# Patient Record
Sex: MALE | URBAN - METROPOLITAN AREA
[De-identification: names, ages, dates, MRNs, and addresses within clinical notes are randomized per-mention and may not be internally consistent; named-entity substitution may affect disease eponyms.]

---

## 2023-09-05 LAB
CHOLESTEROL (MG/DL) IN SER/PLAS: 104 MG/DL (ref 0–199)
CHOLESTEROL IN HDL (MG/DL) IN SER/PLAS: 39.4 MG/DL
CHOLESTEROL/HDL RATIO: 2.6
LDL: 49 MG/DL (ref 0–99)
TRIGLYCERIDE (MG/DL) IN SER/PLAS: 77 MG/DL (ref 0–149)
VLDL: 15 MG/DL (ref 0–40)

## 2023-12-11 DIAGNOSIS — I10 ESSENTIAL HYPERTENSION, BENIGN: Primary | ICD-10-CM

## 2023-12-11 RX ORDER — AMLODIPINE BESYLATE 10 MG/1
10 TABLET ORAL DAILY
COMMUNITY
Start: 2023-09-15 | End: 2023-12-11 | Stop reason: SDUPTHER

## 2023-12-11 RX ORDER — AMLODIPINE BESYLATE 10 MG/1
10 TABLET ORAL DAILY
Qty: 90 TABLET | Refills: 2 | Status: SHIPPED | OUTPATIENT
Start: 2023-12-11 | End: 2024-09-06

## 2023-12-11 NOTE — TELEPHONE ENCOUNTER
Last appointment: 8/30/23 with Dr. Macias  Next appointment: 8/8/24 with Dr. Macias  6/1/23 saw Manasa, but Manasa is out of the office today.

## 2023-12-11 NOTE — TELEPHONE ENCOUNTER
----- Message from Geovany Chirinos sent at 12/10/2023  4:00 PM EST -----  Regarding: Need refill   Contact: [phone]  I need a refill:  Amlodipine 10mg.  [facility].      Thank you.

## 2024-07-26 PROBLEM — E11.9 TYPE 2 DIABETES MELLITUS (MULTI): Status: ACTIVE | Noted: 2024-07-26

## 2024-07-26 PROBLEM — I49.3 PVC (PREMATURE VENTRICULAR CONTRACTION): Status: ACTIVE | Noted: 2024-07-26

## 2024-07-26 PROBLEM — E78.5 HYPERLIPIDEMIA: Status: ACTIVE | Noted: 2024-07-26

## 2024-07-26 PROBLEM — H83.8X3 SUPERIOR SEMICIRCULAR CANAL DEHISCENCE OF BOTH EARS: Status: ACTIVE | Noted: 2024-07-26

## 2024-07-26 PROBLEM — I77.810 MILD DILATION OF ASCENDING AORTA (CMS-HCC): Status: ACTIVE | Noted: 2024-07-26

## 2024-07-26 PROBLEM — K21.9 GERD (GASTROESOPHAGEAL REFLUX DISEASE): Status: ACTIVE | Noted: 2024-07-26

## 2024-07-26 PROBLEM — I10 HYPERTENSION: Status: ACTIVE | Noted: 2024-07-26

## 2024-07-26 PROBLEM — F41.9 ANXIETY: Status: ACTIVE | Noted: 2024-07-26

## 2024-07-26 PROBLEM — R00.2 PALPITATIONS: Status: ACTIVE | Noted: 2024-07-26

## 2024-07-26 RX ORDER — DOXYCYCLINE 100 MG/1
100 CAPSULE ORAL DAILY
COMMUNITY
Start: 2023-11-14

## 2024-07-26 RX ORDER — ASPIRIN 81 MG/1
1 TABLET ORAL DAILY
COMMUNITY
Start: 2019-01-15

## 2024-07-26 RX ORDER — UBIDECARENONE 75 MG
1 CAPSULE ORAL DAILY
COMMUNITY
Start: 2019-01-15

## 2024-07-26 RX ORDER — AVANAFIL 100 MG/1
100 TABLET ORAL DAILY
COMMUNITY
Start: 2023-10-05

## 2024-07-26 RX ORDER — LISINOPRIL AND HYDROCHLOROTHIAZIDE 12.5; 2 MG/1; MG/1
1 TABLET ORAL 2 TIMES DAILY
COMMUNITY
Start: 2023-09-15 | End: 2024-08-08 | Stop reason: SDUPTHER

## 2024-07-26 RX ORDER — NAPROXEN SODIUM 220 MG/1
1 TABLET ORAL DAILY
COMMUNITY
Start: 2019-01-15

## 2024-07-26 RX ORDER — OMEPRAZOLE 40 MG/1
40 CAPSULE, DELAYED RELEASE ORAL DAILY
COMMUNITY

## 2024-07-26 RX ORDER — PHENOL 1.4 %
1 AEROSOL, SPRAY (ML) MUCOUS MEMBRANE DAILY
COMMUNITY
Start: 2019-01-15

## 2024-07-26 RX ORDER — INSULIN LISPRO 100 [IU]/ML
100 INJECTION, SOLUTION INTRAVENOUS; SUBCUTANEOUS DAILY
COMMUNITY

## 2024-07-26 RX ORDER — MAGNESIUM 200 MG
1 TABLET ORAL DAILY
COMMUNITY
Start: 2019-01-15

## 2024-07-26 RX ORDER — QUINAPRIL HYDROCHLORIDE AND HYDROCHLOROTHIAZIDE 20; 12.5 MG/1; MG/1
1 TABLET, FILM COATED ORAL 2 TIMES DAILY
COMMUNITY
Start: 2019-01-15 | End: 2024-08-08 | Stop reason: WASHOUT

## 2024-07-26 RX ORDER — LEVOTHYROXINE SODIUM 50 UG/1
50 TABLET ORAL DAILY
COMMUNITY
Start: 2023-11-08

## 2024-07-26 RX ORDER — METFORMIN HYDROCHLORIDE 1000 MG/1
1000 TABLET ORAL 2 TIMES DAILY
COMMUNITY

## 2024-07-26 RX ORDER — ATORVASTATIN CALCIUM 40 MG/1
40 TABLET, FILM COATED ORAL DAILY
COMMUNITY
End: 2024-08-08 | Stop reason: SDUPTHER

## 2024-08-08 ENCOUNTER — APPOINTMENT (OUTPATIENT)
Dept: CARDIOLOGY | Facility: CLINIC | Age: 59
End: 2024-08-08
Payer: COMMERCIAL

## 2024-08-08 VITALS
BODY MASS INDEX: 37.93 KG/M2 | WEIGHT: 280 LBS | SYSTOLIC BLOOD PRESSURE: 130 MMHG | HEIGHT: 72 IN | HEART RATE: 56 BPM | DIASTOLIC BLOOD PRESSURE: 76 MMHG

## 2024-08-08 DIAGNOSIS — I10 ESSENTIAL HYPERTENSION, BENIGN: ICD-10-CM

## 2024-08-08 DIAGNOSIS — I77.810 MILD DILATION OF ASCENDING AORTA (CMS-HCC): ICD-10-CM

## 2024-08-08 DIAGNOSIS — E78.5 HYPERLIPIDEMIA, UNSPECIFIED HYPERLIPIDEMIA TYPE: Primary | ICD-10-CM

## 2024-08-08 DIAGNOSIS — I49.3 PVC (PREMATURE VENTRICULAR CONTRACTION): ICD-10-CM

## 2024-08-08 DIAGNOSIS — I10 PRIMARY HYPERTENSION: ICD-10-CM

## 2024-08-08 PROCEDURE — 3075F SYST BP GE 130 - 139MM HG: CPT | Performed by: INTERNAL MEDICINE

## 2024-08-08 PROCEDURE — 3008F BODY MASS INDEX DOCD: CPT | Performed by: INTERNAL MEDICINE

## 2024-08-08 PROCEDURE — 93005 ELECTROCARDIOGRAM TRACING: CPT | Performed by: INTERNAL MEDICINE

## 2024-08-08 PROCEDURE — 99214 OFFICE O/P EST MOD 30 MIN: CPT | Performed by: INTERNAL MEDICINE

## 2024-08-08 PROCEDURE — 1036F TOBACCO NON-USER: CPT | Performed by: INTERNAL MEDICINE

## 2024-08-08 PROCEDURE — 3078F DIAST BP <80 MM HG: CPT | Performed by: INTERNAL MEDICINE

## 2024-08-08 PROCEDURE — 93010 ELECTROCARDIOGRAM REPORT: CPT | Performed by: INTERNAL MEDICINE

## 2024-08-08 RX ORDER — VITAMIN E MIXED 400 UNIT
1000 CAPSULE ORAL DAILY
COMMUNITY

## 2024-08-08 RX ORDER — LISINOPRIL AND HYDROCHLOROTHIAZIDE 12.5; 2 MG/1; MG/1
1 TABLET ORAL 2 TIMES DAILY
Qty: 180 TABLET | Refills: 3 | Status: SHIPPED | OUTPATIENT
Start: 2024-08-08 | End: 2025-08-08

## 2024-08-08 RX ORDER — ATORVASTATIN CALCIUM 40 MG/1
40 TABLET, FILM COATED ORAL DAILY
Qty: 90 TABLET | Refills: 3 | Status: SHIPPED | OUTPATIENT
Start: 2024-08-08 | End: 2025-08-08

## 2024-08-08 RX ORDER — BISOPROLOL FUMARATE 10 MG/1
10 TABLET, FILM COATED ORAL 2 TIMES DAILY
COMMUNITY
End: 2024-08-08 | Stop reason: SDUPTHER

## 2024-08-08 RX ORDER — AMLODIPINE BESYLATE 10 MG/1
10 TABLET ORAL DAILY
Qty: 90 TABLET | Refills: 3 | Status: SHIPPED | OUTPATIENT
Start: 2024-08-08 | End: 2025-08-03

## 2024-08-08 RX ORDER — BISOPROLOL FUMARATE 10 MG/1
10 TABLET, FILM COATED ORAL 2 TIMES DAILY
Qty: 180 TABLET | Refills: 3 | Status: SHIPPED | OUTPATIENT
Start: 2024-08-08 | End: 2025-08-08

## 2024-08-08 ASSESSMENT — ENCOUNTER SYMPTOMS
OCCASIONAL FEELINGS OF UNSTEADINESS: 0
LOSS OF SENSATION IN FEET: 0
DEPRESSION: 0

## 2024-08-08 NOTE — PROGRESS NOTES
Chief Complaint:   Annual Exam (yearly)     History Of Present Illness:    Geovany Chirinos is a 59 y.o. male presenting for annual follow-up.    He has a history of diabetes mellitus for 24 years who was initially seen in 2018 for chest pain and palpitations and for a second opinion. He has had recurrent chest pain throughout his life and had a normal cardiac catheterization in 1997 at [facility]. Subsequently had exercise stress test in 2010 and then again in 2017 both of which were unremarkable. More recently he started having chest pain(different from prior episodes), occasionally radiating to the back, lasting 10-15 minutes, sometimes associated with activities and at other times after activities. We did a coronary CT angiogram in 2019 showing normal coronaries.    He also has history of palpitations. He does admit to anxiety precipitating some of his symptoms, but also has been diagnosed to have symptomatic PVCs. He was prescribed beta blockers which has not adequately controlled his symptoms. He had a lot of anxiety around this initially but currently adjusting much better. He continues to have on and off PVCs. We did a Holter in 2020 December that showed 4 PVCs and 3 PACs over 24-hour monitoring.  He denies shortness of breath, orthopnea, PND, ankle swelling, lightheadedness or history of loss of consciousness.  On follow up, he states that his symptoms subsided once he started taking medicines for anxiety and after receiving reports of his coronary CTA. But he stopped taking zoloft as it was changing his personality and making him tired.   Cardiac CTA done 1/30/19 showed Calcium score of 21, in LAD with minimal plaque in proximal to mid LAD. The aortic root was mildly ectatic at 4 cm.     We repeated his echocardiogram shows normal LV function, stage I diastolic dysfunction moderate LVH, maximum size of the aorta was calculated to be 3.9 cm. This echo was done on December 05, 2019.  Echo from 2020  shows normal LV function, size of aorta was calculated to be 3.8 cm.  He has been diagnosed with mild/sleep apnea and is currently using CPAP regularly.      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 His symptoms are more pronounced when he lies on the right side or after heavy meals.  He tells me he recently got diagnosed with Hashimoto's thyroiditis and started on Synthroid. He had a lipid profile in September 2022 showing total cholesterol of 112, triglycerides 69, HDL 36 LDL 62. Echo 2023 showing ascending aorta 3.9 cm.  He is a nonsmoker. No family history of premature coronary artery disease.     EKG today shows normal sinus rhythm. Right bundle branch block that is pre-existing since April 2023..     I reviewed [facility] including Holter report where palpitations sometimes coincided with PVCs.       .     Last Recorded Vitals:  Vitals:    08/08/24 1543   BP: 130/76   BP Location: Left arm   Pulse: 56   Weight: 127 kg (280 lb)   Height: 1.829 m (6')       Past Medical History:  He has a past medical history of Chest pain, unspecified (01/15/2019), Personal history of malignant neoplasm of other organs and systems, and Personal history of other infectious and parasitic diseases.    Past Surgical History:  He has a past surgical history that includes Other surgical history (01/15/2019) and Other surgical history (01/15/2019).      Social History:  He reports that he has never smoked. He has never used smokeless tobacco. No history on file for alcohol use and drug use.    Family History:  No family history on file.     Allergies:  Penicillins    Outpatient Medications:  Current Outpatient Medications   Medication Instructions    amLODIPine (NORVASC) 10 mg, oral, Daily, for blood pressure    aspirin 81 mg EC tablet 1 tablet, oral, Daily    atorvastatin (LIPITOR) 40 mg, oral, Daily    bisoprolol (ZEBETA) 10 mg, oral, 2  "times daily    cyanocobalamin (Vitamin B-12) 500 mcg tablet 1 tablet, oral, Daily    doxycycline (MONODOX) 100 mg, oral, Daily    HumaLOG U-100 Insulin 100 Units, subcutaneous, Daily    levothyroxine (SYNTHROID, LEVOXYL) 50 mcg, oral, Daily    lisinopriL-hydrochlorothiazide 20-12.5 mg tablet 1 tablet, oral, Daily    magnesium 200 mg tablet 1 tablet, oral, Daily    metFORMIN (GLUCOPHAGE) 1,000 mg, oral, 2 times daily    multivitamin with minerals (Adults Multivitamin) tablet 1 tablet, oral, Daily    omega 3-dha-epa-fish oil (Fish OiL) 1,200 (144-216) mg capsule 1 capsule, oral, Daily    omeprazole (PRILOSEC) 40 mg, oral, Daily    quinapriL-hydrochlorothiazide (Accuretic) 20-12.5 mg tablet 1 tablet, oral, 2 times daily    Stendra 100 mg, oral, Daily    vitamin E 1,000 Units, oral, Daily       Physical Exam:  Physical Exam  Vitals reviewed.   Constitutional:       Appearance: Normal appearance.   Neck:      Vascular: No carotid bruit or JVD.   Cardiovascular:      Rate and Rhythm: Normal rate and regular rhythm.      Heart sounds: Normal heart sounds, S1 normal and S2 normal. No murmur heard.  Pulmonary:      Effort: Pulmonary effort is normal.      Breath sounds: Normal breath sounds.   Abdominal:      General: Abdomen is flat. Bowel sounds are normal.      Palpations: Abdomen is soft.   Musculoskeletal:      Right lower leg: No edema.      Left lower leg: No edema.   Skin:     General: Skin is warm.   Neurological:      Mental Status: He is alert. Mental status is at baseline.   Psychiatric:         Mood and Affect: Mood normal.         Behavior: Behavior normal.           Last Labs:  CBC -  No results found for: \"WBC\", \"HGB\", \"HCT\", \"MCV\", \"PLT\"    CMP -  Lab Results   Component Value Date    CALCIUM 9.6 01/24/2019       LIPID PANEL -   Lab Results   Component Value Date    CHOL 104 09/05/2023    TRIG 77 09/05/2023    HDL 39.4 (A) 09/05/2023    CHHDL 2.6 09/05/2023    LDLF 49 09/05/2023    VLDL 15 09/05/2023 " "      RENAL FUNCTION PANEL -   Lab Results   Component Value Date    GLUCOSE 119 (H) 01/24/2019     01/24/2019    K 4.1 01/24/2019     01/24/2019    CO2 34 (H) 01/24/2019    ANIONGAP 10 01/24/2019    BUN 10 01/24/2019    CREATININE 0.98 01/24/2019    CALCIUM 9.6 01/24/2019        Lab Results   Component Value Date    HGBA1C 7.2 (A) 06/20/2024    HGBA1C 7.5 (H) 05/18/2023       Last Cardiology Tests:  ECG:  No results found for this or any previous visit from the past 1095 days.      Echo:  No results found for this or any previous visit from the past 1095 days.      Ejection Fractions:  No results found for: \"EF\"    Cath:  No results found for this or any previous visit from the past 1095 days.      Stress Test:  No results found for this or any previous visit from the past 1095 days.      Cardiac Imaging:  No results found for this or any previous visit from the past 1095 days.          Assessment/Plan     In summary Mr. Chirinos is a 59-year-old gentleman with above-mentioned history here for follow-up.    1- Coronary Atherosclerosis- CTA showed minimal plaque. On lipitor for primary prevention, LDL 55 in 2019. 93 in 2020 after he started keto diet. Recommending increasing Lipitor to 40 mg daily, specially with history of diabetes mellitus.  [place] lipid profile is favorable.  Will repeat calcium score in 2025.    2- Symptoms (chest pain and symptomatic PVCs) subsided. He had another recurrence of symptomatic PVCs in the later part of 2021 in early 2022 but resolved, we briefly discussed taking flecainide if symptoms are disabling. At this time he states he is got a hold of as symptoms are improving. He was looking for reassurance. In absence of any new cardiac symptoms further work-up is not warranted.     3- Ectatic ascending aorta- Follow up with echo and CT in future to monitor size of aorta. BP control. Goal BP discussed with him. Blood pressures generally much better controlled at home. Maximum " aortic diameter measured was 4 cm. Aortic diameter 2 patient height ratio= 2.2 cm/m. Indicates lower risk of rupture. Plan on evaluating this with echocardiogram in 2025.     Previous echo showed mildly ectatic aortic size(done at [facility] last year, but described to be much smaller than 4 cm). Size stable. Echo in 2020 showed maximum size of 3.8 cm. Echo 2023 3.9 cm.     4- HTN- Home/self monitoring of BP encouraged. Goal BP discussed. Lifestyle advise given. He is losing weight, .  He showed me his blood pressure log, most days his blood pressure is well-controlled.. He is going to follow-up with PCP for blood pressure control.     Suni Macias MD

## 2025-07-16 PROBLEM — E03.9 ACQUIRED HYPOTHYROIDISM: Status: ACTIVE | Noted: 2023-06-13

## 2025-07-16 PROBLEM — H90.72 MIXED CONDUCTIVE AND SENSORINEURAL HEARING LOSS OF LEFT EAR WITH UNRESTRICTED HEARING OF RIGHT EAR: Status: ACTIVE | Noted: 2019-07-23

## 2025-07-16 PROBLEM — H69.92 DYSFUNCTION OF LEFT EUSTACHIAN TUBE: Status: ACTIVE | Noted: 2023-08-02

## 2025-07-16 PROBLEM — Z96.41 INSULIN PUMP STATUS: Status: ACTIVE | Noted: 2017-01-12

## 2025-07-16 PROBLEM — N52.9 ERECTILE DYSFUNCTION: Status: ACTIVE | Noted: 2017-11-02

## 2025-07-16 PROBLEM — N40.1 BPH WITH OBSTRUCTION/LOWER URINARY TRACT SYMPTOMS: Status: ACTIVE | Noted: 2017-11-02

## 2025-07-16 PROBLEM — H93.12 SUBJECTIVE TINNITUS OF LEFT EAR: Status: ACTIVE | Noted: 2023-08-02

## 2025-07-16 PROBLEM — E66.812 OBESITY, CLASS II, BMI 35-39.9: Status: ACTIVE | Noted: 2018-06-29

## 2025-07-16 PROBLEM — H83.8X9 SEMICIRCULAR CANAL DEHISCENCE SYNDROME: Status: ACTIVE | Noted: 2023-08-02

## 2025-07-16 PROBLEM — H93.A2 PULSATILE TINNITUS, LEFT EAR: Status: ACTIVE | Noted: 2019-07-23

## 2025-07-16 PROBLEM — N13.8 BPH WITH OBSTRUCTION/LOWER URINARY TRACT SYMPTOMS: Status: ACTIVE | Noted: 2017-11-02

## 2025-07-16 RX ORDER — TIRZEPATIDE 5 MG/.5ML
INJECTION, SOLUTION SUBCUTANEOUS WEEKLY
COMMUNITY
Start: 2025-06-26 | End: 2025-08-11 | Stop reason: WASHOUT

## 2025-08-07 ENCOUNTER — HOSPITAL ENCOUNTER (OUTPATIENT)
Dept: CARDIOLOGY | Facility: HOSPITAL | Age: 60
Discharge: HOME | End: 2025-08-07
Payer: COMMERCIAL

## 2025-08-07 ENCOUNTER — HOSPITAL ENCOUNTER (OUTPATIENT)
Dept: RADIOLOGY | Facility: HOSPITAL | Age: 60
Discharge: HOME | End: 2025-08-07
Payer: COMMERCIAL

## 2025-08-07 DIAGNOSIS — I77.810 MILD DILATION OF ASCENDING AORTA: ICD-10-CM

## 2025-08-07 DIAGNOSIS — E78.5 HYPERLIPIDEMIA, UNSPECIFIED HYPERLIPIDEMIA TYPE: ICD-10-CM

## 2025-08-07 LAB — EJECTION FRACTION: 58 %

## 2025-08-07 PROCEDURE — 93308 TTE F-UP OR LMTD: CPT | Performed by: INTERNAL MEDICINE

## 2025-08-07 PROCEDURE — 75571 CT HRT W/O DYE W/CA TEST: CPT

## 2025-08-07 PROCEDURE — 93308 TTE F-UP OR LMTD: CPT

## 2025-08-10 ENCOUNTER — PATIENT MESSAGE (OUTPATIENT)
Dept: CARDIOLOGY | Facility: HOSPITAL | Age: 60
End: 2025-08-10
Payer: COMMERCIAL

## 2025-08-10 DIAGNOSIS — E78.5 HYPERLIPIDEMIA, UNSPECIFIED HYPERLIPIDEMIA TYPE: ICD-10-CM

## 2025-08-11 PROBLEM — Z85.89 HISTORY OF SQUAMOUS CELL CARCINOMA: Status: ACTIVE | Noted: 2025-08-11

## 2025-08-11 PROBLEM — I77.810 ASCENDING AORTA DILATATION: Status: ACTIVE | Noted: 2019-01-30

## 2025-08-11 PROBLEM — Z28.39 NOT UP TO DATE WITH SCHEDULED IMMUNIZATIONS: Status: ACTIVE | Noted: 2025-08-11

## 2025-08-11 PROBLEM — T81.31XA DEHISCENCE OF OPERATIVE WOUND: Status: ACTIVE | Noted: 2024-07-26

## 2025-08-11 PROBLEM — Z86.19 HISTORY OF HERPES ZOSTER: Status: ACTIVE | Noted: 2025-08-11

## 2025-08-11 PROBLEM — G80.9 CEREBRAL PALSY: Status: ACTIVE | Noted: 2019-01-30

## 2025-08-11 RX ORDER — CLINDAMYCIN PHOSPHATE 10 MG/G
GEL TOPICAL
COMMUNITY

## 2025-08-11 RX ORDER — TIRZEPATIDE 2.5 MG/.5ML
2.5 INJECTION, SOLUTION SUBCUTANEOUS
COMMUNITY
Start: 2025-08-01

## 2025-08-11 RX ORDER — ATORVASTATIN CALCIUM 40 MG/1
40 TABLET, FILM COATED ORAL DAILY
Qty: 90 TABLET | Refills: 0 | Status: SHIPPED | OUTPATIENT
Start: 2025-08-11 | End: 2025-11-09

## 2025-08-21 ENCOUNTER — OFFICE VISIT (OUTPATIENT)
Dept: CARDIOLOGY | Facility: HOSPITAL | Age: 60
End: 2025-08-21
Payer: COMMERCIAL

## 2025-08-21 VITALS
WEIGHT: 257 LBS | DIASTOLIC BLOOD PRESSURE: 70 MMHG | SYSTOLIC BLOOD PRESSURE: 136 MMHG | BODY MASS INDEX: 35.98 KG/M2 | HEART RATE: 58 BPM | HEIGHT: 71 IN

## 2025-08-21 DIAGNOSIS — I77.810 MILD DILATION OF ASCENDING AORTA: ICD-10-CM

## 2025-08-21 DIAGNOSIS — I10 PRIMARY HYPERTENSION: ICD-10-CM

## 2025-08-21 DIAGNOSIS — E78.5 HYPERLIPIDEMIA, UNSPECIFIED HYPERLIPIDEMIA TYPE: ICD-10-CM

## 2025-08-21 DIAGNOSIS — I49.3 PVC (PREMATURE VENTRICULAR CONTRACTION): ICD-10-CM

## 2025-08-21 DIAGNOSIS — I10 ESSENTIAL HYPERTENSION, BENIGN: ICD-10-CM

## 2025-08-21 LAB
ATRIAL RATE: 58 BPM
P AXIS: 40 DEGREES
P OFFSET: 156 MS
P ONSET: 88 MS
PR INTERVAL: 266 MS
Q ONSET: 221 MS
QRS COUNT: 9 BEATS
QRS DURATION: 148 MS
QT INTERVAL: 422 MS
QTC CALCULATION(BAZETT): 414 MS
QTC FREDERICIA: 417 MS
R AXIS: -33 DEGREES
T AXIS: -13 DEGREES
T OFFSET: 432 MS
VENTRICULAR RATE: 58 BPM

## 2025-08-21 PROCEDURE — 1036F TOBACCO NON-USER: CPT | Performed by: INTERNAL MEDICINE

## 2025-08-21 PROCEDURE — 93005 ELECTROCARDIOGRAM TRACING: CPT | Performed by: INTERNAL MEDICINE

## 2025-08-21 PROCEDURE — 3075F SYST BP GE 130 - 139MM HG: CPT | Performed by: INTERNAL MEDICINE

## 2025-08-21 PROCEDURE — 99214 OFFICE O/P EST MOD 30 MIN: CPT | Performed by: INTERNAL MEDICINE

## 2025-08-21 PROCEDURE — 3078F DIAST BP <80 MM HG: CPT | Performed by: INTERNAL MEDICINE

## 2025-08-21 PROCEDURE — 99213 OFFICE O/P EST LOW 20 MIN: CPT

## 2025-08-21 PROCEDURE — 3008F BODY MASS INDEX DOCD: CPT | Performed by: INTERNAL MEDICINE

## 2025-08-21 RX ORDER — AMLODIPINE BESYLATE 5 MG/1
5 TABLET ORAL DAILY
Qty: 90 TABLET | Refills: 3 | Status: SHIPPED | OUTPATIENT
Start: 2025-08-21 | End: 2026-08-21

## 2025-08-21 RX ORDER — LISINOPRIL AND HYDROCHLOROTHIAZIDE 12.5; 2 MG/1; MG/1
1 TABLET ORAL 2 TIMES DAILY
Qty: 180 TABLET | Refills: 3 | Status: SHIPPED | OUTPATIENT
Start: 2025-08-21 | End: 2026-08-21

## 2025-08-21 RX ORDER — ATORVASTATIN CALCIUM 40 MG/1
40 TABLET, FILM COATED ORAL DAILY
Qty: 90 TABLET | Refills: 0 | Status: SHIPPED | OUTPATIENT
Start: 2025-08-21 | End: 2025-11-19

## 2025-08-21 ASSESSMENT — ENCOUNTER SYMPTOMS
LOSS OF SENSATION IN FEET: 0
OCCASIONAL FEELINGS OF UNSTEADINESS: 0
DEPRESSION: 0

## 2025-08-25 ENCOUNTER — PATIENT MESSAGE (OUTPATIENT)
Dept: CARDIOLOGY | Facility: HOSPITAL | Age: 60
End: 2025-08-25
Payer: COMMERCIAL

## 2025-08-25 DIAGNOSIS — I10 ESSENTIAL HYPERTENSION, BENIGN: ICD-10-CM

## 2025-08-25 DIAGNOSIS — I49.3 PVC (PREMATURE VENTRICULAR CONTRACTION): ICD-10-CM

## 2025-08-26 RX ORDER — BISOPROLOL FUMARATE 10 MG/1
10 TABLET, FILM COATED ORAL 2 TIMES DAILY
Qty: 180 TABLET | Refills: 3 | Status: SHIPPED | OUTPATIENT
Start: 2025-08-26 | End: 2026-08-26
